# Patient Record
Sex: FEMALE | Race: WHITE | Employment: FULL TIME | URBAN - METROPOLITAN AREA
[De-identification: names, ages, dates, MRNs, and addresses within clinical notes are randomized per-mention and may not be internally consistent; named-entity substitution may affect disease eponyms.]

---

## 2018-06-05 ENCOUNTER — HOSPITAL ENCOUNTER (OUTPATIENT)
Dept: MAMMOGRAPHY | Facility: HOSPITAL | Age: 53
Discharge: HOME | End: 2018-06-05
Attending: OBSTETRICS & GYNECOLOGY
Payer: COMMERCIAL

## 2018-06-05 DIAGNOSIS — Z12.39 BREAST CANCER SCREENING: ICD-10-CM

## 2018-06-05 PROCEDURE — 77067 SCR MAMMO BI INCL CAD: CPT | Performed by: OBSTETRICS & GYNECOLOGY

## 2018-06-05 PROCEDURE — 77063 BREAST TOMOSYNTHESIS BI: CPT | Performed by: OBSTETRICS & GYNECOLOGY

## 2018-06-14 ENCOUNTER — HOSPITAL ENCOUNTER (OUTPATIENT)
Dept: MAMMOGRAPHY | Facility: HOSPITAL | Age: 53
Discharge: HOME | End: 2018-06-14
Attending: OBSTETRICS & GYNECOLOGY
Payer: COMMERCIAL

## 2018-06-14 DIAGNOSIS — R92.8 OTH ABN AND INCONCLUSIVE FINDINGS ON DX IMAGING OF BREAST: ICD-10-CM

## 2018-06-14 PROCEDURE — 77066 DX MAMMO INCL CAD BI: CPT | Performed by: OBSTETRICS & GYNECOLOGY

## 2018-06-14 PROCEDURE — 77062 BREAST TOMOSYNTHESIS BI: CPT | Performed by: OBSTETRICS & GYNECOLOGY

## 2024-02-14 NOTE — ED INITIAL ASSESSMENT (HPI)
Patient with cough x 2 weeks.  Taking otc medications without resolution of symptoms.  Denies fevers.  States cough is intermittently productive.  Negative at home covid tests.

## 2024-02-14 NOTE — ED PROVIDER NOTES
Patient Seen in: [facility]      History     Chief Complaint   Patient presents with    Cough/URI     Stated Complaint:     Subjective:   HPI    Kimberly Hammer is a 58 year old female here for cough that is not getting better. Admits recent travel from [place]. No sob, chest pain, or something feeling like it is sitting on her chest. Subjective fever, and feeling fatigue.  Denies sinus symptoms.    Objective:   History reviewed. No pertinent past medical history.           Past Surgical History:   Procedure Laterality Date    CYST ASPIRATION RIGHT                  No pertinent social history.            Review of Systems    Positive for stated complaint:   Other systems are as noted in HPI.  Constitutional and vital signs reviewed.      All other systems reviewed and negative except as noted above.    Physical Exam     ED Triage Vitals [02/14/24 0845]   /69   Pulse 79   Resp 20   Temp 98.1 °F (36.7 °C)   Temp src Temporal   SpO2 96 %   O2 Device None (Room air)       Current:/69   Pulse 79   Temp 98.1 °F (36.7 °C) (Temporal)   Resp 20   SpO2 96%         Physical Exam  Vitals and nursing note reviewed.   Constitutional:       General: She is not in acute distress.     Appearance: Normal appearance. She is ill-appearing. She is not toxic-appearing.   HENT:      Head: Normocephalic.      Right Ear: Tympanic membrane, ear canal and external ear normal.      Left Ear: Tympanic membrane, ear canal and external ear normal.      Nose: Congestion present. No rhinorrhea.      Mouth/Throat:      Mouth: Mucous membranes are moist.      Pharynx: No oropharyngeal exudate or posterior oropharyngeal erythema.   Eyes:      Extraocular Movements: Extraocular movements intact.      Conjunctiva/sclera: Conjunctivae normal.      Pupils: Pupils are equal, round, and reactive to light.   Neck:      Vascular: No carotid bruit.   Cardiovascular:      Rate and Rhythm: Normal rate and regular rhythm.       Pulses: Normal pulses.   Pulmonary:      Effort: Pulmonary effort is normal. No respiratory distress.      Breath sounds: Wheezing (scattered) and rhonchi present.   Musculoskeletal:      Cervical back: Normal range of motion. No erythema or rigidity. No pain with movement, spinous process tenderness or muscular tenderness. Normal range of motion.   Lymphadenopathy:      Cervical: No cervical adenopathy.      Right cervical: No superficial, deep or posterior cervical adenopathy.     Left cervical: No superficial, deep or posterior cervical adenopathy.   Skin:     General: Skin is warm.      Capillary Refill: Capillary refill takes less than 2 seconds.      Findings: No lesion or rash.   Neurological:      General: No focal deficit present.      Mental Status: She is alert and oriented to person, place, and time.   Psychiatric:         Mood and Affect: Mood normal.         Behavior: Behavior normal.         Thought Content: Thought content normal.         Judgment: Judgment normal.               ED Course     Labs Reviewed   RAPID SARS-COV-2 BY PCR - Normal          XR CHEST PA + LAT CHEST (CPT=71046)    Result Date: 2/14/2024  CONCLUSION:  1. Mild nonspecific peribronchial thickening.  Differential includes bronchial wall inflammation/bronchitis , asthma versus viral process. 2. No acute alveolar consolidation     Dictated by (CST): Remigio Carey MD on 2/14/2024 at 9:10 AM     Finalized by (CST): Remigio Carey MD on 2/14/2024 at 9:12 AM                    [facility]          Medical Decision Making  Differential diagnosis includes but not limited to bronchitis, pneumonia, pneumothorax, bronchospasm, COVID, flu, and/or pulmonary embolism.    We will treat for viral bronchitis.  Antibiotics not indicated at this time.  Symptoms better after duo nebulizer treatment.  Inhaler sent to the pharmacy.  Ramos Steward sent to the pharmacy. Refused further workup, and higher level of care for [facility]. (+) Based on  age, and recent long travel.  She is aware that anytime she is not feeling much better, feeling shortness of breath, or feeling chest heaviness to go to the emergency room.   Symptoms better after duo nebulizer treatment.  Wheezing better.  Reinforced albuterol inhaler for cough.  Primary care follow-up.  Call today for an appointment within the next 7 to 10 days.  Strict ER precautions discussed, and reassurance given.  No acute distress, cleared for discharge home.    Problems Addressed:  Acute cough: acute illness or injury  Bronchitis: acute illness or injury  Bronchospasm: acute illness or injury    Amount and/or Complexity of Data Reviewed  Radiology: ordered and independent interpretation performed. Decision-making details documented in ED Course.     Details: Agree with radiologist Cherry. Mild nonspecific peribronchial thickening, w/o acute alveolar consolidation.             Disposition and Plan     Clinical Impression:  1. Bronchitis    2. Acute cough    3. Bronchospasm         Disposition:  Discharge  2/14/2024  9:35 am    Follow-up:  Friedell, Stanley  [address] [facility]  [address] [place], [place] [id number]  [phone]                Medications Prescribed:  Discharge Medication List as of 2/14/2024  9:35 AM        START taking these medications    Details   benzonatate 100 MG Oral Cap Take 1 capsule (100 mg total) by mouth 3 (three) times daily as needed for cough., Normal, Disp-30 capsule, R-0NPI [id number].  Collaborating physician Francine Steinberg.      albuterol 108 (90 Base) MCG/ACT Inhalation Aero Soln Inhale 1 puff and hold breath for 10 seconds then exhale.  Wait 1 full minute and repeat for second puff.  Use every 4-6 hours as needed., Normal, Disp-1 each, R-0NPI [id number]. Collaborating MD Francine Steinberg.

## 2024-02-14 NOTE — DISCHARGE INSTRUCTIONS
Go through all your printed paperwork.  You are responsible for your discharge instructions that includes your after visit summary, home care, and follow-up care.  We are seeing these coughs linger anywhere between 6 to 8 weeks to 3 to 4 months.  Contact your primary care.  Call them today for follow-up appointment within a week.  If your cough is getting worse after another 2 weeks.  Antibiotics do not treat symptoms of cough, or viral illnesses and will not be prescribed from this visit.  Avoid milk/cheese products.  This can increase mucus production causing her cough to be worse, and increase inflammation.  Albuterol inhaler 2 puffs 1 minute apart.  You will inhale 1 puff and hold your breath for 10 seconds and then exhale.  Wait 60 seconds, and do the same for the second puff.  Do this every 4-6 hours as needed for coughing spasms.  For your follow-up appointment due to your cough they are going to ask if you are using your inhaler.  Please make sure you are using this if you are having these coughing spasms.  As we discussed with your recent travel, and age make sure the emergency room for any new or worsening symptoms such as shortness of breath, chest heaviness, or coughing up blood.

## (undated) NOTE — LETTER
Date & Time: 2/14/2024, 9:19 AM  Patient: Kimberly Hammer  Encounter Provider(s):    Angie Ames APRN       To Whom It May Concern:    Kimberly Hammer was seen and treated in our department on 2/14/2024. She should not return to work until until she remains fever free without the use of fever reducing medications.    VANDANA Lafleur, 02/14/24, 9:19 AM